# Patient Record
Sex: MALE | Race: OTHER | HISPANIC OR LATINO | URBAN - METROPOLITAN AREA
[De-identification: names, ages, dates, MRNs, and addresses within clinical notes are randomized per-mention and may not be internally consistent; named-entity substitution may affect disease eponyms.]

---
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
## 2020-01-18 ENCOUNTER — EMERGENCY (EMERGENCY)
Facility: HOSPITAL | Age: 20
LOS: 1 days | Discharge: HOME | End: 2020-01-18
Attending: EMERGENCY MEDICINE | Admitting: EMERGENCY MEDICINE
Payer: SELF-PAY

## 2020-01-18 VITALS
HEART RATE: 98 BPM | HEIGHT: 66 IN | RESPIRATION RATE: 16 BRPM | WEIGHT: 149.91 LBS | TEMPERATURE: 98 F | OXYGEN SATURATION: 99 % | DIASTOLIC BLOOD PRESSURE: 74 MMHG | SYSTOLIC BLOOD PRESSURE: 122 MMHG

## 2020-01-18 PROCEDURE — 73140 X-RAY EXAM OF FINGER(S): CPT | Mod: 26,LT

## 2020-01-18 PROCEDURE — 99283 EMERGENCY DEPT VISIT LOW MDM: CPT

## 2020-01-18 PROCEDURE — 72110 X-RAY EXAM L-2 SPINE 4/>VWS: CPT

## 2020-01-18 PROCEDURE — 73140 X-RAY EXAM OF FINGER(S): CPT

## 2020-01-18 PROCEDURE — 99053 MED SERV 10PM-8AM 24 HR FAC: CPT

## 2020-01-18 PROCEDURE — 72110 X-RAY EXAM L-2 SPINE 4/>VWS: CPT | Mod: 26

## 2020-01-18 RX ORDER — OXYCODONE AND ACETAMINOPHEN 5; 325 MG/1; MG/1
1 TABLET ORAL ONCE
Refills: 0 | Status: DISCONTINUED | OUTPATIENT
Start: 2020-01-18 | End: 2020-01-18

## 2020-01-18 RX ORDER — IBUPROFEN 200 MG
1 TABLET ORAL
Qty: 20 | Refills: 0
Start: 2020-01-18 | End: 2020-01-22

## 2020-01-18 RX ADMIN — OXYCODONE AND ACETAMINOPHEN 1 TABLET(S): 5; 325 TABLET ORAL at 03:40

## 2020-01-18 NOTE — ED PROVIDER NOTE - NSFOLLOWUPCLINICS_GEN_ALL_ED_FT
[facility]  Family Medicine  [address] [place] [id number]  Phone: [phone]  Fax:   Follow Up Time:

## 2020-01-18 NOTE — ED PROVIDER NOTE - CLINICAL SUMMARY MEDICAL DECISION MAKING FREE TEXT BOX
pt P/w c/o pain in fingers of left hand and lower back s/p accidental injury by a car door,  mod edema fo 3rd and 4th fingers of left hand, normal back exam, xrays of both are negative, pt advised cold compress and pain medication like Ibuprofen if needed

## 2020-01-18 NOTE — ED ADULT TRIAGE NOTE - CHIEF COMPLAINT QUOTE
Pt presents to ED w/ c/o hand pain. Pt was getting into car & friend slammed door against pts back, pts hand was caught in the door. Pt unable to move 3rd & 4th finger.

## 2020-01-18 NOTE — ED PROVIDER NOTE - PATIENT PORTAL LINK FT
You can access the FollowMyHealth Patient Portal offered by [facility] by registering at the following website: [email or web address]. By joining [facility]’s FollowMyHealth portal, you will also be able to view your health information using other applications (apps) compatible with our system.

## 2020-01-18 NOTE — ED PROVIDER NOTE - CARE PLAN
Principal Discharge DX:	Contusion of left ring finger without damage to nail, initial encounter  Secondary Diagnosis:	Back strain, initial encounter

## 2020-01-18 NOTE — ED ADULT NURSE NOTE - OBJECTIVE STATEMENT
pt states that while getting into car a friend slammed door into his back. and then he got his left hand stuck in door as it was being closed.

## 2020-01-18 NOTE — ED PROVIDER NOTE - OBJECTIVE STATEMENT
18 y/o male with no sig PMHX presents to  ED c/o pain in 3rd and 4th finger of left hand with swelling, as per pt he was trying to get in car door and someone accidently closed the door, his fingers got caught in car door. 18 y/o male with no sig PMHX presents to  ED c/o pain in 3rd and 4th finger of left hand with swelling, as per pt he was trying to get in car door and someone accidently closed the door, his fingers got caught in car door. Also c/o pain lower back because car door hit on his back

## 2020-01-18 NOTE — ED PROVIDER NOTE - MUSCULOSKELETAL, MLM
fingers of left hand 3 rd and 4th, no gross deformity, mod edema, decreased ROM , + tenderness , cap refill < 2 sec. has small subungual hematoma of 4th digit nail fingers of left hand 3 rd and 4th, no gross deformity, mod edema, decreased ROM , + tenderness , cap refill < 2 sec. has small subungual hematoma of 4th digit nail, lumber spine normal , no bony tenderness